# Patient Record
(demographics unavailable — no encounter records)

---

## 2024-11-26 NOTE — HISTORY OF PRESENT ILLNESS
[FreeTextEntry1] : 81yoF pmhx: DM, HTN, RBBB, Aortic stenosis, HLD and hypothyroidism, colon polyps. Presents today for diarrhea.      Pt had a colonoscopy in 2021 with Dr. Roblero's team. States she had a large polyp that was unable to be removed. Dx with carcinoma in situ.  Patient had colon resection due to inability to remove a large polyp, with Baker type anastomosis at [facility] [place].  Patient states she has been having intermittent diarrhea since her colon resection after eating. Unsure of triggering factors other than eating. States she does not have diarrhea on a daily basis and cannot determine when it will happen. States she becomes incontinent at times when trying to get to the bathroom. Patient denies any abdominal pain, nausea, vomiting, dysphagia, heart burn, unintentional weight loss, diarrhea, constipation, melena, hematochezia.   Last Colonoscopy was in 2023 at [facility] [place]. Had polyps removed. small cecal polyp was tubular adenoma, transverse colon polyp turned to be normal colonic mucosa on pathology Patient had Cologuard last month and was normal

## 2024-11-26 NOTE — PHYSICAL EXAM
[Systolic grade ___/6] : A grade [unfilled]/6 systolic murmur was heard. [Normal] : normal bowel sounds, non-tender, no masses, soft, no no hepato-splenomegaly [Oriented To Time, Place, And Person] : oriented to person, place, and time [Alert] : alert [Normal Voice/Communication] : normal voice/communication [Healthy Appearing] : healthy appearing [No Acute Distress] : no acute distress [Sclera] : the sclera and conjunctiva were normal [Hearing Threshold Finger Rub Not [place]] : hearing was normal [Normal Lips/Gums] : the lips and gums were normal [Oropharynx] : the oropharynx was normal [Normal Appearance] : the appearance of the neck was normal [No Neck Mass] : no neck mass was observed [No Respiratory Distress] : no respiratory distress [No Acc Muscle Use] : no accessory muscle use [Respiration, Rhythm And Depth] : normal respiratory rhythm and effort [Auscultation Breath Sounds / Voice Sounds] : lungs were clear to auscultation bilaterally [Heart Rate And Rhythm] : heart rate was normal and rhythm regular [Normal S1, S2] : normal S1 and S2 [Bowel Sounds] : normal bowel sounds [Abdomen Tenderness] : non-tender [No Masses] : no abdominal mass palpated [Abdomen Soft] : soft [] : no hepatosplenomegaly [No CVA Tenderness] : no CVA  tenderness [de-identified] : systolic murmur noted

## 2024-11-26 NOTE — ASSESSMENT
[FreeTextEntry1] : 81yoF pmhx: DM, HTN, RBBB, Aortic stenosis, HLD and hypothyroidism, colon polyps. Presents today for diarrhea.     Diarrhea -Discussed BRAT diet, advised to add protein to diet  -will order stool studies to r/o infectious etiology  -Educated on Kegal exercises to strengthen pelvic floor  -Will consider anal rectal manometry if no improvement with pelvic floor exercises - Can consider PRN Imodium if infectious etiology is ruled out  CRC surveillance  -Patient will send report from last colonoscopy in 2023 at [facility] to determine next CRC Screening -Likely 2026 will be next colonoscopy if the one on 2023 is good prep   Follow up in 4 months and PRN

## 2025-02-12 NOTE — ASSESSMENT
[FreeTextEntry1] : 81 year old woman with DM, HTN, HLD and hypothyroidism who presents for follow up today.   1. HTN: BP at goal in the office today. BP at goal at home. Her goal is <130/80 mmHg. - Continue valsartan 40mg daily; BMP with PCP - Recommended to keep a BP log at home and call me if BP is consistently above goal  2. HLD: LDL goal is <100. Increased Rosuvastatin 40mg to three times a week.   3. RBBB: On EKG; asymptomatic from a cardiac perspective. Echocardiogram stable. - EKG at every visit  4. 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 - Yearly echo  The primary prevention of heart disease was discussed in detail with the patient, including adhering to a heart healthy, plant based, or Mediterranean diet, and the importance of 30 minutes of moderate intensity activity for 30 minutes, 5 times a week. All the patient's questions were answered.  RTC in 6 months.

## 2025-02-12 NOTE — CARDIOLOGY SUMMARY
[de-identified] : 2/12/24: normal sinus rhythm, RBBB, LAFB 6/12/24: sinus bradycardia, RBBB, LAFB 1/4/24: normal sinus rhythm, RBBB, LAFB 7/6/23: normal sinus rhythm, RBBB, LAFB [de-identified] : 6/5/24: 1. Left ventricular cavity is normal in size. Left ventricular systolic function is normal with an ejection fraction of 67 % by Love's method of disks. 2. Normal right ventricular cavity size and normal systolic function. 3. The left atrium is mildly dilated. 4. Prolapse of the posterior mitral leaflet. Mild mitral regurgitation. 5. 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 Mild aortic stenosis. 6. Mild tricuspid regurgitation. 7. Compared to the transthoracic echocardiogram performed on 5/30/2023, there have been no significant interval changes.  5/30/23: 1. Normal left ventricular cavity size. The left ventricular systolic function is normal with an ejection fraction of 62 % by Love's method of disks. There is moderate (grade 2) left ventricular diastolic dysfunction. 2. Normal right ventricular cavity size and normal systolic function. 3. Prolapse of the posterior mitral leaflet. Mild mitral regurgitation. 4. 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 Mild aortic stenosis. 5. Estimated pulmonary artery systolic pressure is 37 mmHg, consistent with borderline pulmonary hypertension. 6. No prior echocardiogram is available for comparison

## 2025-02-12 NOTE — HISTORY OF PRESENT ILLNESS
[FreeTextEntry1] : KENNETH KEBEDE is a 81 year old woman with DM, HTN, HLD and hypothyroidism who presents for follow up today.   Today, the patient denies chest pain, shortness of breath, palpitations and syncope. No leg swelling, orthopnea and PND.  For her HTN, she is on valsartan and is tolerating it well. Her BP is at goal when she checks it at home.   For her HLD, she is on rosuvastatin. She is taking rosuvastatin only thrice a week due to myalgias.   Her functional capacity is >4 METS.

## 2025-02-12 NOTE — PHYSICAL EXAM
[Well Developed] : well developed [Well Nourished] : well nourished [No Acute Distress] : no acute distress [Normal Conjunctiva] : normal conjunctiva [Normal Venous Pressure] : normal venous pressure [No Carotid Bruit] : no carotid bruit [Normal S1, S2] : normal S1, S2 [No Rub] : no rub [No Gallop] : no gallop [Clear Lung Fields] : clear lung fields [Good Air Entry] : good air entry [No Respiratory Distress] : no respiratory distress  [Soft] : abdomen soft [Non Tender] : non-tender [No Masses/organomegaly] : no masses/organomegaly [Normal Bowel Sounds] : normal bowel sounds [Normal Gait] : normal gait [No Edema] : no edema [No Cyanosis] : no cyanosis [No Clubbing] : no clubbing [No Varicosities] : no varicosities [No Rash] : no rash [No Skin Lesions] : no skin lesions [Moves all extremities] : moves all extremities [No Focal Deficits] : no focal deficits [Normal Speech] : normal speech [Alert and Oriented] : alert and oriented [Normal memory] : normal memory [de-identified] : 2/6 MEENA SHREE border

## 2025-04-11 NOTE — ADDENDUM
[FreeTextEntry1] : Mitesh Bledsoe MD, HARINI  of Urology, [facility] [employer] Director, [facility] at [address], [address], [place] [address] Phone: [phone] Fax: [phone]  	 Department of Urology [facility] at [place]  Consultation  VISIT DATE: 2025  REFFERING PROVIDER: n/a  PRIMARY CARE PROVIDER: Dr. Fina Barnes  PROBLEM LIST: -	Calculus of the kidney N20.0  CHIEF COMPLAINT: left flank pain  HISTORY OF PRESENT ILLNESS: The patient is a 81 year old female with past medical history of aortic valve stenosis mitral valve insufficiency, right bundle branch block, GERD DM2, HTN, HLD, Breast CA (in remission), and bowel resection who presented to the ED on 2025 with complaint of left flank pain associated with vomiting and was found to have multiple, small, bilateral, non-obstructing renal stones. She presents for follow up.  The patient reports that she is asymptomatic. She denies fever, flank pain, hematuria, and dysuria.   First stone identification: 2025 Number of stone episodes: one Interventions: no surgical intervention  Family history of stone disease: none Fluid intake: 1 quart Salt intake: med Dark soda intake: none Animal protein intake: on of three Citrus intake: no Vitamin C intake: no Oxalate intake: + nuts Overweight: no  I had a 15-minute discussion with this patient regarding stone preventive measures that are conservative in nature.  We talked about drinking roughly 3-4 L per day in order to make >2.5L of urine per day, limited salt intake to <2000mg sodium per day, minimizing phosphoric acid containing sodas (alexander), increasing citric acid containing fruits or beverages, minimizing animal protein < 2meals per day and less than 6-8 ounces, limiting the intake of high oxalate foods, < 1000 mg Vitamin C, normal calcium intake and moderate exercise and maintain a healthy weight.  PAST MEDICAL HISTORY: Aortic valve stenosis Mitral valve insufficiency Right bundle branch block GERD DM2 HTN HLD Breast CA (in remission) Diverticulitis Hypothyroid Kidney stones Skin CA (basal and squamous)   PAST SURGICAL HISTORY: Bowel resection (for sigmoid polyp that could not be resected)  Hysterectomy  Right lower eyelid procedure (skin CA)  MEDICATIONS: Valsartan Levothyroxine Vit D3 Omeprazole  ALLERGIES: Procaine, epinephrine, Novocain  FAMILY HISTORY: The patient has no family history of stone disease.  SOCIAL HISTORY: Denies smoking, alcohol, and drug use  REVIEW OF SYSTEMS: 12-point review of system was conducted including: systemic review, skin, head-eyes-ears-nose-throat, respiratory, cardiovascular, gastrointestinal, endocrine, neck, genitourinary, musculoskeletal, neuro-psychiatric, and hematologic. The review of systems was negative except as per HPI.  PHYSICAL EXAMINATION: Vital Signs: (see above)  BMI: 24 General: Awake, alert, well appearing, and in no acute distress. HEENT: Normocephalic, atraumatic. Lungs: Normal respiratory effort, bilateral chest rise. Heart: Regular rate and rhythm. Abdomen: Soft, nontender, and nondistended. : No costovertebral angle tenderness bilaterally  Extremities: warm and dry.  LABORATORY: 2025: WBC: 8.67 Hgb: 13.3 Plt: 141 K: 3.8 Cr: 1.0 Ca: 8.9  Urine Analysis: 2025: negative for infection, pH 5.5  Urine Culture and Sensitivities: None on file  IMAGIN2025 CTAP: IMPRESSION: Mild left hydroureter with surrounding periureteral fat stranding. No obstructing ureteral calculus seen. The appearance could be secondary to a recently passed calculus. Few left-sided punctate nonobstructing renal calculi. Multiple pelvic phleboliths are identified. Colonic diverticulosis without evidence of diverticulitis.  Dr. Bledsoe interpretation: Image evaluation for stone disease Imaging type and date: 2025 CTAP Left ureter: clear Left kidney: -	Upper pole: clear -	Interpole: 1.6 x 1.7 x 1.3 mm () -	Lower pole: 2.2 x 1.7 x unknown ()  Skin to stone distance: -	Posterior: 13 cm -	45 de cm -	Lateral: 11 cm -	Average: > 10 cm Stent Length: 24 cm Additional notes: stone to skin too far for ESWL  Right ureter: clear Right kidney: -	Upper pole: clear -	Interpole: clear -	Lower pole: 1.8 x 1.24 x 1.2 mm   Skin to stone distance: -	Posterior: 8.8 cm -	45 de.5 mm -	Lateral: 9.8 mm -	Average: < 10 cm Stent Length: 22 cm Additional notes: possible ESWL candidate  ASSESSMENT:  The patient is a 81 year old female with past medical history of aortic valve stenosis mitral valve insufficiency, right bundle branch block, GERD DM2, HTN, HLD, Breast CA (in remission), and bowel resection who presented to the ED on 2025 with complaint of left flank pain associated with vomiting and was found to have multiple, small, bilateral, non-obstructing renal stones. She presents for follow up.  PLAN: -	I counseled the patient on diet and lifestyle modifications for general stone disease prevention -	PTH in 6 weeks -	24-hour urine x 2 in 6 weeks -	RTC in 8 weeks to review labs  For billing purposes  High level visit (CPT code 98977 or 47102): -	I personally spent 60 minutes on total care of this patient excluding separately reported services and / or teaching.  Longitudinal care (CPT code ): -	The patient is diagnosed with stone disease, which is a chronic condition due to lifestyle choices or metabolic dysregulation, and requires longitudinal care to prevent disease progression, colic episodes, and complications.   Preventative medicine counseling (CPT code 01349): -	Lifestyle modifications and conservative management can be used to prevent future stone disease or augment medical therapies in the stone forming patient. Counseling of at least 15 minutes has been documented in this note.

## 2025-05-28 NOTE — ASSESSMENT
[FreeTextEntry1] : Mitesh Bledsoe MD, HARINI  of Urology, [facility] [employer] Director, [facility] at [address], [address], [place] [address] Phone: [phone] Fax: [phone]  	 Department of Urology [facility] at [place]  Consultation  VISIT DATE: 2025  REFFERING PROVIDER: n/a  PRIMARY CARE PROVIDER: Dr. Fina Barnes  PROBLEM LIST: -	Calculus of the kidney N20.0 -	Hypocitraturia R82.991  CHIEF COMPLAINT: left flank pain  HISTORY OF PRESENT ILLNESS: The patient is a 81 year old female with past medical history of aortic valve stenosis mitral valve insufficiency, right bundle branch block, GERD DM2, HTN, HLD, Breast CA (in remission), and bowel resection who presented to the ED on 2025 with complaint of left flank pain associated with vomiting and was found to have multiple, small, bilateral, non-obstructing renal stones. She presents for follow up.  The patient reports that she is asymptomatic. She denies fever, flank pain, hematuria, and dysuria.   The patient does report that she has reflux and, despite taking omeprazole, she has trouble taking in citrus. In addition, she has sudden gut emptying that began after her sigmoid colon resection for a stage zero polyp.   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   First stone identification: 2025 Number of stone episodes: one Interventions: no surgical intervention  Family history of stone disease: none Fluid intake: 1 quart Salt intake: med Dark soda intake: none Animal protein intake: on of three Citrus intake: no Vitamin C intake: no Oxalate intake: + nuts Overweight: no  I had a 15-minute discussion with this patient regarding stone preventive measures that are conservative in nature.  We talked about drinking roughly 3-4 L per day in order to make >2.5L of urine per day, limited salt intake to <2000mg sodium per day, minimizing phosphoric acid containing sodas (alexander), increasing citric acid containing fruits or beverages, minimizing animal protein < 2meals per day and less than 6-8 ounces, limiting the intake of high oxalate foods, < 1000 mg Vitamin C, normal calcium intake and moderate exercise and maintain a healthy weight.  PAST MEDICAL HISTORY: Aortic valve stenosis Mitral valve insufficiency Right bundle branch block GERD DM2 HTN HLD Breast CA (in remission) Diverticulitis Hypothyroid Kidney stones Skin CA (basal and squamous)   PAST SURGICAL HISTORY: Bowel resection (for sigmoid polyp that could not be resected)  Hysterectomy  Right lower eyelid procedure (skin CA)  MEDICATIONS: Valsartan Levothyroxine Vit D3 Omeprazole  ALLERGIES: Procaine, epinephrine, Novocain  FAMILY HISTORY: The patient has no family history of stone disease.  SOCIAL HISTORY: Denies smoking, alcohol, and drug use  REVIEW OF SYSTEMS: 12-point review of system was conducted including: systemic review, skin, head-eyes-ears-nose-throat, respiratory, cardiovascular, gastrointestinal, endocrine, neck, genitourinary, musculoskeletal, neuro-psychiatric, and hematologic. The review of systems was negative except as per HPI.  PHYSICAL EXAMINATION: Vital Signs: (see above)  BMI: 24 General: Awake, alert, well appearing, and in no acute distress. HEENT: Normocephalic, atraumatic. Lungs: Normal respiratory effort, bilateral chest rise. Heart: Regular rate and rhythm. Abdomen: Soft, nontender, and nondistended. : No costovertebral angle tenderness bilaterally  Extremities: warm and dry.  LABORATORY: 2025: WBC: 8.67 Hgb: 13.3 Plt: 141 K: 3.8 Cr: 1.0 Ca: 8.9  4/15/2025 PTH: 30  Urine Analysis: 2025: negative for infection, pH 5.5  Urine Culture and Sensitivities: None on file  [employer]: Collection Date: 2025 Urine Volume: 0.84 L SSCaOx: 5.08 Urine calcium: 67 mg/day Urine oxalate: 17 mg/day Urine citrate: 300 mg/day Supersaturation of Calcium phosphate: 0.24  24hr Urine pH: 5.690 Supersaturation of uric acid: 1.66 Urine uric acid: 0.409 g/day Urine sodium: 138 Urine creatinine: 13.3 24/kg  Collection Date: 2025 Urine Volume: 0.68 L SSCaOx: 6.48 Urine calcium: 90 mg/day Urine oxalate: 12 mg/day Urine citrate: 224 mg/day Supersaturation of Calcium phosphate: 0.62 24hr Urine pH: 5.791 Supersaturation of uric acid: 2.02 Urine uric acid: 0.477 g/day Urine sodium: 115 Urine creatinine: 10.1 24/kg  IMAGIN2025 CTAP: IMPRESSION: Mild left hydroureter with surrounding periureteral fat stranding. No obstructing ureteral calculus seen. The appearance could be secondary to a recently passed calculus. Few left-sided punctate nonobstructing renal calculi. Multiple pelvic phleboliths are identified. Colonic diverticulosis without evidence of diverticulitis.  Dr. Bledsoe interpretation: Image evaluation for stone disease Imaging type and date: 2025 CTAP Left ureter: clear Left kidney: -	Upper pole: clear -	Interpole: 1.6 x 1.7 x 1.3 mm () -	Lower pole: 2.2 x 1.7 x unknown ()  Skin to stone distance: -	Posterior: 13 cm -	45 de cm -	Lateral: 11 cm -	Average: > 10 cm Stent Length: 24 cm Additional notes: stone to skin too far for ESWL  Right ureter: clear Right kidney: -	Upper pole: clear -	Interpole: clear -	Lower pole: 1.8 x 1.24 x 1.2 mm   Skin to stone distance: -	Posterior: 8.8 cm -	45 de.5 mm -	Lateral: 9.8 mm -	Average: < 10 cm Stent Length: 22 cm Additional notes: possible ESWL candidate  ASSESSMENT:  The patient is a 81 year old female with past medical history of aortic valve stenosis mitral valve insufficiency, right bundle branch block, GERD DM2, HTN, HLD, Breast CA (in remission), and bowel resection who presented to the ED on 2025 with complaint of left flank pain associated with vomiting and was found to have multiple, small, bilateral, non-obstructing renal stones. She presents for follow up.  Metabolically, the patient needs to double her fluid consumption and increase her citrate. Her sudden gut motility might explain the low levels of phosphorus and magnesium. Rather than move directly to baking soda, the patient prefers to increase fluid intake with some lemon for now to adjust one variable at a time.   PLAN: -	I counseled the patient on diet and lifestyle modifications for general stone disease prevention -	Graduated water bottle to increase fluid volume -	24-hour urine x 1 in 10 weeks -	We will plan on a repeat CTAP 2026 -	RTC in 12 weeks to review labs  For billing purposes  High level visit (CPT code 62229 or 44679): -	I personally spent 40 minutes on total care of this patient excluding separately reported services and / or teaching.  Longitudinal care (CPT code ): -	The patient is diagnosed with stone disease, which is a chronic condition due to lifestyle choices or metabolic dysregulation, and requires longitudinal care to prevent disease progression, colic episodes, and complications.   Preventative medicine counseling (CPT code 68961): -	Lifestyle modifications and conservative management can be used to prevent future stone disease or augment medical therapies in the stone forming patient. Counseling of at least 15 minutes has been documented in this note.

## 2025-06-18 NOTE — REASON FOR VISIT
[Structural Heart and Valve Disease] : structural heart and valve disease [Hypertension] : hypertension [Hyperlipidemia] : hyperlipidemia

## 2025-06-19 NOTE — PHYSICAL EXAM
[Well Developed] : well developed [Well Nourished] : well nourished [No Acute Distress] : no acute distress [Normal Conjunctiva] : normal conjunctiva [Normal Venous Pressure] : normal venous pressure [No Carotid Bruit] : no carotid bruit [Normal S1, S2] : normal S1, S2 [No Rub] : no rub [No Gallop] : no gallop [Clear Lung Fields] : clear lung fields [Good Air Entry] : good air entry [No Respiratory Distress] : no respiratory distress  [Soft] : abdomen soft [Non Tender] : non-tender [No Masses/organomegaly] : no masses/organomegaly [Normal Bowel Sounds] : normal bowel sounds [Normal Gait] : normal gait [No Edema] : no edema [No Cyanosis] : no cyanosis [No Clubbing] : no clubbing [No Varicosities] : no varicosities [No Rash] : no rash [No Skin Lesions] : no skin lesions [Moves all extremities] : moves all extremities [No Focal Deficits] : no focal deficits [Normal Speech] : normal speech [Alert and Oriented] : alert and oriented [Normal memory] : normal memory [de-identified] : 2/6 MEENA SHREE border

## 2025-06-19 NOTE — CARDIOLOGY SUMMARY
[de-identified] : 6/18/25: normal sinus rhythm, RBBB, LAFB 2/12/24: normal sinus rhythm, RBBB, LAFB 6/12/24: sinus bradycardia, RBBB, LAFB 1/4/24: normal sinus rhythm, RBBB, LAFB 7/6/23: normal sinus rhythm, RBBB, LAFB [de-identified] : 6/5/24: 1. Left ventricular cavity is normal in size. Left ventricular systolic function is normal with an ejection fraction of 67 % by Love's method of disks. 2. Normal right ventricular cavity size and normal systolic function. 3. The left atrium is mildly dilated. 4. Prolapse of the posterior mitral leaflet. Mild mitral regurgitation. 5. 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 Mild aortic stenosis. 6. Mild tricuspid regurgitation. 7. Compared to the transthoracic echocardiogram performed on 5/30/2023, there have been no significant interval changes.  5/30/23: 1. Normal left ventricular cavity size. The left ventricular systolic function is normal with an ejection fraction of 62 % by Love's method of disks. There is moderate (grade 2) left ventricular diastolic dysfunction. 2. Normal right ventricular cavity size and normal systolic function. 3. Prolapse of the posterior mitral leaflet. Mild mitral regurgitation. 4. 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 Mild aortic stenosis. 5. Estimated pulmonary artery systolic pressure is 37 mmHg, consistent with borderline pulmonary hypertension. 6. No prior echocardiogram is available for comparison

## 2025-06-19 NOTE — PHYSICAL EXAM
[Well Developed] : well developed [Well Nourished] : well nourished [No Acute Distress] : no acute distress [Normal Conjunctiva] : normal conjunctiva [Normal Venous Pressure] : normal venous pressure [No Carotid Bruit] : no carotid bruit [Normal S1, S2] : normal S1, S2 [No Rub] : no rub [No Gallop] : no gallop [Clear Lung Fields] : clear lung fields [Good Air Entry] : good air entry [No Respiratory Distress] : no respiratory distress  [Soft] : abdomen soft [Non Tender] : non-tender [No Masses/organomegaly] : no masses/organomegaly [Normal Bowel Sounds] : normal bowel sounds [Normal Gait] : normal gait [No Edema] : no edema [No Cyanosis] : no cyanosis [No Clubbing] : no clubbing [No Varicosities] : no varicosities [No Rash] : no rash [No Skin Lesions] : no skin lesions [Moves all extremities] : moves all extremities [No Focal Deficits] : no focal deficits [Normal Speech] : normal speech [Alert and Oriented] : alert and oriented [Normal memory] : normal memory [de-identified] : 2/6 MEENA SHREE border

## 2025-06-19 NOTE — CARDIOLOGY SUMMARY
[de-identified] : 6/18/25: normal sinus rhythm, RBBB, LAFB 2/12/24: normal sinus rhythm, RBBB, LAFB 6/12/24: sinus bradycardia, RBBB, LAFB 1/4/24: normal sinus rhythm, RBBB, LAFB 7/6/23: normal sinus rhythm, RBBB, LAFB [de-identified] : 6/5/24: 1. Left ventricular cavity is normal in size. Left ventricular systolic function is normal with an ejection fraction of 67 % by Love's method of disks. 2. Normal right ventricular cavity size and normal systolic function. 3. The left atrium is mildly dilated. 4. Prolapse of the posterior mitral leaflet. Mild mitral regurgitation. 5. 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 Mild aortic stenosis. 6. Mild tricuspid regurgitation. 7. Compared to the transthoracic echocardiogram performed on 5/30/2023, there have been no significant interval changes.  5/30/23: 1. Normal left ventricular cavity size. The left ventricular systolic function is normal with an ejection fraction of 62 % by Love's method of disks. There is moderate (grade 2) left ventricular diastolic dysfunction. 2. Normal right ventricular cavity size and normal systolic function. 3. Prolapse of the posterior mitral leaflet. Mild mitral regurgitation. 4. 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 Mild aortic stenosis. 5. Estimated pulmonary artery systolic pressure is 37 mmHg, consistent with borderline pulmonary hypertension. 6. No prior echocardiogram is available for comparison

## 2025-06-19 NOTE — ASSESSMENT
[FreeTextEntry1] : 81 year old woman with DM, HTN, HLD and hypothyroidism who presents for follow up today.   1. HTN: BP above goal in the office today. She is not checking her BP at home. Her goal is <130/80 mmHg. - Continue valsartan 40mg daily; BMP with PCP - Recommended to keep a BP log at home and call me if BP is consistently above goal  2. HLD: LDL goal is <100. Increased Rosuvastatin 40mg to three times a week.   3. RBBB: On EKG; asymptomatic from a cardiac perspective. Echocardiogram stable. - EKG at every visit  4. 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 - Yearly echo  The primary prevention of heart disease was discussed in detail with the patient, including adhering to a heart healthy, plant based, or Mediterranean diet, and the importance of 30 minutes of moderate intensity activity for 30 minutes, 5 times a week. All the patient's questions were answered.  RTC in 6 months.

## 2025-06-19 NOTE — HISTORY OF PRESENT ILLNESS
[FreeTextEntry1] : KENNETH KEBEDE is a 81 year old woman with DM, HTN, HLD and hypothyroidism who presents for follow up today.   Today, the patient denies chest pain, shortness of breath, palpitations and syncope. No leg swelling, orthopnea and PND.  For her HTN, she is on valsartan and is tolerating it well. She is not checking her BP at home.   For her HLD, she is on rosuvastatin. She is taking rosuvastatin only thrice a week due to myalgias.   Her functional capacity is >4 METS.   She is struggling with kidney stones.